# Patient Record
(demographics unavailable — no encounter records)

---

## 2024-10-21 NOTE — PHYSICAL EXAM
[No HSM] : no hepatosplenomegaly [No Rash or Lesion] : No rash or lesion [Alert] : alert [Oriented to Person] : oriented to person [Oriented to Place] : oriented to place [Oriented to Time] : oriented to time [de-identified] : well appearing woman in no distress [de-identified] : normocephalic, no scleral icterus [de-identified] : soft nontender, nondistended; no rebound or guarding.

## 2024-10-21 NOTE — HISTORY OF PRESENT ILLNESS
[FreeTextEntry1] : Mrs. Leal is a 72 year old woman who was admitted with a perforated appendicitis with abscess formation. She underwent IR drainage and tube was removed in which she developed an abscess again. She underwent another round of antibiotics and presents for follow up today. The patient denies fever or chills. She denies pain at the moment. Previously pain had been in the right lower quadrant.  She is currently tolerating diet and having normal bowel movements. Given she had a colonoscopy 8 years prior, I recommended prior to a laparoscopic appendectomy I would like a colonoscopy performed.  After the colonoscopy, patient is to get a repeat CT abdomen/pelvis to assess for degree of inflammation.  I described the procedure of a laparoscopic appendectomy and also discuss the risk of a midline incision occurring. 
20 G R AC saline lock

## 2024-10-21 NOTE — ASSESSMENT
[FreeTextEntry1] : 72 year old woman with recent episode of perforated appendicitis. -Plan for colonoscopy and CT scan prior to laparoscopic appendectomy.

## 2025-01-13 NOTE — HISTORY OF PRESENT ILLNESS
[FreeTextEntry1] : 72 year old woman s/p laparoscopic appendectomy who presents for follow up. She denies fever, chills, nausea or emesis. Ocassionally she feels a flutter in her stomach but tolerates her diet.  She is having normal bowel movements.  I discussed the pathology with the patient which was appendicitis.  We also discussed incidental findings. She has cardiomegaly and she said she will follow up with a cardiologist for this. 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 I asked the patient to see her PCP as these findings need radiographic follow up. She confirmed she will reach out to her physician. Other than that she is feeling well.

## 2025-01-13 NOTE — ASSESSMENT
[FreeTextEntry1] : 72 year old woman s/p laparoscopic appendectomy for perforated appendicitis doing well. -Patient is to follow up with her PCP for lung nodules and adrenal mass  -can return PRN for further surgical follow up

## 2025-01-13 NOTE — PHYSICAL EXAM
[de-identified] : well appearing woman in no distress [de-identified] : normocephalic  [de-identified] : soft nontender, nondistended; no rebound or guarding. INcisions are clean, dry and intact